# Patient Record
(demographics unavailable — no encounter records)

---

## 2024-10-21 NOTE — HISTORY OF PRESENT ILLNESS
[FreeTextEntry1] : Follow-up and disease management. [de-identified] : Patient is 77-year-old female who presents today for follow-up and disease management .  Medical history is significant for reflux, hyperlipidemia, hypertension and mild hyperglycemia.

## 2024-10-21 NOTE — REVIEW OF SYSTEMS
[Sore Throat] : sore throat [Heartburn] : heartburn [Joint Pain] : joint pain [Negative] : Heme/Lymph [Earache] : no earache [Hearing Loss] : no hearing loss [Nosebleed] : no nosebleeds [Hoarseness] : no hoarseness [Nasal Discharge] : no nasal discharge [Postnasal Drip] : no postnasal drip [Chest Pain] : no chest pain [Palpitations] : no palpitations [Leg Claudication] : no leg claudication [Lower Ext Edema] : no lower extremity edema [Orthopnea] : no orthopnea [Paroxysmal Nocturnal Dyspnea] : no paroxysmal nocturnal dyspnea [Abdominal Pain] : no abdominal pain [Nausea] : no nausea [Constipation] : no constipation [Diarrhea] : diarrhea [Vomiting] : no vomiting [Melena] : no melena [FreeTextEntry7] : Dyspepsia [FreeTextEntry9] : right elbow effusion,right wrist ganglion

## 2024-10-21 NOTE — HEALTH RISK ASSESSMENT
[Yes] : Yes [2 - 3 times a week (3 pts)] : 2 - 3  times a week (3 points) [1 or 2 (0 pts)] : 1 or 2 (0 points) [Never (0 pts)] : Never (0 points) [No] : In the past 12 months have you used drugs other than those required for medical reasons? No [No falls in past year] : Patient reported no falls in the past year [Never] : Never [Audit-CScore] : 3

## 2024-10-21 NOTE — ASSESSMENT
[FreeTextEntry1] : Assessment and plan:  1.  Fluzone high-dose influenza vaccine administered right deltoid  2.  Reflux continue H2 blocker famotidine 20 mg daily. 3.  Hyperlipidemia patient does follow a low-fat low-cholesterol diet and also continues to take simvastatin 20 mg p.o. at bedtime.  4.  Hypertension when first taken systolic blood pressure 156/65  repeat blood pressure at end of visit systolic blood pressure dropped to 138/60.  Patient will continue amlodipine, hydrochlorothiazide 25 and metoprolol succinate 25 mg.  5.  Comprehensive blood work drawn in office by examiner.  6.  Health maintenance issues discussed patient is candidate for RSV and COVID-19 vaccination she is up-to-date with Prevnar 20.  7.  Total time spent face-to-face and non-face-to-face time 35 minutes the majority of that time was spent on counseling and coordination of care.  Patient is mourning the death of her  she has no suicidal or homicidal ideation she is just melancholic and grieving.

## 2025-01-23 NOTE — HISTORY OF PRESENT ILLNESS
[FreeTextEntry1] : Patient with positive cardiac risk factors for CAD presents for f/u evaluation. on last vist pt c/o few episodes lasting minutes of right jaw pain radiating down chest and back on right side. last seen 1 year ago, pt denies new sx, was not compliant with stress test as her  was ill and subsequently passed. pt last seen 1 year ago.

## 2025-01-23 NOTE — DISCUSSION/SUMMARY
[Bundle Branch Block] : ~T bundle branch block [Stress Test Treadmill] : an exercise treadmill test [Isotope Perfusion Sestamibi] : cardiac perfusion imaging with sestamibi [Stable] : stable [GERD] : gastroesophageal reflux disease [Musculoskeletal Chest Pain] : musculoskeletal chest pain [Costochondritis] : costochondritis [Exercise Stress Test] : exercise stress test [Nuclear Imaging] : nuclear imaging [Hypertension] : hypertension [Not Responding to Treatment] : not responding to treatment [Echocardiogram] : echocardiogram [Medication Changes Per Orders] : Medication changes are as documented in orders [Minutes Spent: ___] : for [unfilled] ~Uminutes [de-identified] : inc norvasc to 5 mg, chg to am, consider add arb [FreeTextEntry2] : last seen > 1 year ago, reviewed prior eval

## 2025-02-25 NOTE — HEALTH RISK ASSESSMENT
[Very Good] : ~his/her~ current health as very good [Good] : ~his/her~  mood as  good [2 - 3 times a week (3 pts)] : 2 - 3  times a week (3 points) [1 or 2 (0 pts)] : 1 or 2 (0 points) [Never (0 pts)] : Never (0 points) [No] : In the past 12 months have you used drugs other than those required for medical reasons? No [No falls in past year] : Patient reported no falls in the past year [Little interest or pleasure doing things] : 1) Little interest or pleasure doing things [0] : 1) Little interest or pleasure doing things: Not at all (0) [Feeling down, depressed, or hopeless] : 2) Feeling down, depressed, or hopeless [Yes] : Reviewed medication list for presence of high-risk medications. [Never] : Never [NO] : No [Patient reported mammogram was normal] : Patient reported mammogram was normal [Patient reported bone density results were normal] : Patient reported bone density results were normal [HIV test declined] : HIV test declined [Hepatitis C test offered] : Hepatitis C test offered [None] : None [Alone] : lives alone [Retired] : retired [High School] : high school [] :  [Feels Safe at Home] : Feels safe at home [Fully functional (bathing, dressing, toileting, transferring, walking, feeding)] : Fully functional (bathing, dressing, toileting, transferring, walking, feeding) [Fully functional (using the telephone, shopping, preparing meals, housekeeping, doing laundry, using] : Fully functional and needs no help or supervision to perform IADLs (using the telephone, shopping, preparing meals, housekeeping, doing laundry, using transportation, managing medications and managing finances) [Smoke Detector] : smoke detector [Carbon Monoxide Detector] : carbon monoxide detector [Safety elements used in home] : safety elements used in home [1] : 2) Feeling down, depressed, or hopeless for several days (1) [PHQ-2 Negative - No further assessment needed] : PHQ-2 Negative - No further assessment needed [Time Spent: ___ Minutes] : I spent [unfilled] minutes performing a depression screening for this patient. [Patient declined PAP Smear] : Patient declined PAP Smear [Patient reported colonoscopy was normal] : Patient reported colonoscopy was normal [With Patient/Caregiver] : , with patient/caregiver [Reviewed no changes] : Reviewed, no changes [Designated Healthcare Proxy] : Designated healthcare proxy [Name: ___] : Health Care Proxy's Name: [unfilled]  [Relationship: ___] : Relationship: [unfilled] [Aggressive treatment] : aggressive treatment [I will adhere to the patient's wishes.] : I will adhere to the patient's wishes. [Audit-CScore] : 3 [[id number]] : 1 [FreeTextEntry1] : No high risk medications identified [Change in mental status noted] : No change in mental status noted [Language] : denies difficulty with language [Behavior] : denies difficulty with behavior [Learning/Retaining New Information] : denies difficulty learning/retaining new information [Handling Complex Tasks] : denies difficulty handling complex tasks [Reasoning] : denies difficulty with reasoning [Spatial Ability and Orientation] : denies difficulty with spatial ability and orientation [Sexually Active] : not sexually active [Reports changes in hearing] : Reports no changes in hearing [Reports changes in vision] : Reports no changes in vision [Reports normal functional visual acuity (ie: able to read med bottle)] : Reports poor functional visual acuity.  [Reports changes in dental health] : Reports no changes in dental health [MammogramDate] : 06/23 [BoneDensityDate] : 09/20 [ColonoscopyDate] : 04/11 [ColonoscopyComments] : Patient opts for fecal occult blood immunology. [AdvancecareDate] : 02/25

## 2025-02-25 NOTE — ASSESSMENT
[FreeTextEntry1] : Assessment and plan:  1.  Annual wellness exam no acute physical findings, patient scheduled for stress test by cardiology, depression screening done approximately 7 to 8 minutes was spent on it patient recently lost her  but denies any suicidal or homicidal ideation and does not require any medications.  2.  Reflux continue H2 blocker famotidine 20 mg daily. 3.  Hyperlipidemia patient does follow a low-fat low-cholesterol diet and also continues to take simvastatin 20 mg p.o. at bedtime.  4.  Hypertension when first taken systolic blood pressure 156/65  repeat blood pressure at end of visit systolic blood pressure dropped to 138/60.  Patient will continue amlodipine, hydrochlorothiazide 25 and metoprolol succinate 25 mg.  5.  Comprehensive blood work drawn in office by examiner.  6.  Health maintenance issues discussed patient is up-to-date with all of her vaccines including RSV and Zostavax.  Patient did have a colonoscopy many years ago declines colonoscopy at this time but is willing to do fecal occult blood immunology.  7.  No change in medical management.

## 2025-02-25 NOTE — HISTORY OF PRESENT ILLNESS
[FreeTextEntry1] : Annual wellness exam. [de-identified] : Patient is a 78-year-old female who presents today for annual wellness exam.  Medical history significant for atypical chest pain patient recently seen by cardiology given a clean bill of health, gastroesophageal reflux disease, hyperlipidemia, hypertension, peripheral vascular disease.  Presently the patient is awake alert and oriented x 3 she is in no acute distress calm and cooperative.

## 2025-04-10 NOTE — ASSESSMENT
[FreeTextEntry1] : Alert, oriented, well pleasant.   Sun-exposed cutaneous exam. No evidence of cutaneous malignancy. Well healed scars. No evidence of recurrence.  Brown, yellow papules and plaques generalized.  Including right inferior lateral leg. Seborrheic keratoses.  No treatment.  Actinic damage.  Reviewed sun protection.  Compliant.  eroded plaque 1x2cm medial left mid buttock. Itchy, tingling. No pain. Dermatitis. ?HSV, no previous history. Denies oozing. start triamcinolone ointment twice per day for 2-4 weeks.  f/u 1 month if not resolved.  Follow up 1 year.

## 2025-04-10 NOTE — HISTORY OF PRESENT ILLNESS
[FreeTextEntry1] : Skin check. Noted a new bump on left butt cheek last Thursday. "Started itching then little swelling" Treats with bacitracin with slight improvement.     [de-identified] : Personal history scc left hand. Father had skin cancer.    Wilfrido passed in September.

## 2025-07-08 NOTE — HEALTH RISK ASSESSMENT
[Yes] : Yes [2 - 3 times a week (3 pts)] : 2 - 3  times a week (3 points) [1 or 2 (0 pts)] : 1 or 2 (0 points) [Never (0 pts)] : Never (0 points) [No] : In the past 12 months have you used drugs other than those required for medical reasons? No [No falls in past year] : Patient reported no falls in the past year [Little interest or pleasure doing things] : 1) Little interest or pleasure doing things [Feeling down, depressed, or hopeless] : 2) Feeling down, depressed, or hopeless [0] : 2) Feeling down, depressed, or hopeless: Not at all (0) [PHQ-2 Negative - No further assessment needed] : PHQ-2 Negative - No further assessment needed [With Patient/Caregiver] : , with patient/caregiver [Reviewed no changes] : Reviewed, no changes [Designated Healthcare Proxy] : Designated healthcare proxy [Name: ___] : Health Care Proxy's Name: [unfilled]  [Relationship: ___] : Relationship: [unfilled] [Aggressive treatment] : aggressive treatment [I will adhere to the patient's wishes.] : I will adhere to the patient's wishes. [Never] : Never [de-identified] : Dermatology, cardiology. [Audit-CScore] : 3 [[id number]] : 0 [AdvancecareDate] : 07/25

## 2025-07-08 NOTE — END OF VISIT
[Time Spent: ___ minutes] : I have spent [unfilled] minutes of time on the encounter which excludes teaching and separately reported services.
Vaginal

## 2025-07-08 NOTE — HISTORY OF PRESENT ILLNESS
[FreeTextEntry1] : Patient presents today for follow-up and disease management. [de-identified] : Patient is a 78-year-old female who presents today for annual wellness exam.  Medical history significant for atypical chest pain patient recently seen by cardiology given a clean bill of health, gastroesophageal reflux disease, hyperlipidemia, hypertension, peripheral vascular disease.  Presently the patient is awake alert and oriented x 3 she is in no acute distress calm and cooperative.

## 2025-07-08 NOTE — ASSESSMENT
[FreeTextEntry1] : Assessment and plan: 1.  Gastroesophageal reflux/heartburn well-controlled with H2 blocker patient will continue famotidine 20 mg p.o. daily.  2.  Hyperlipidemia patient does follow a low-fat low-cholesterol diet and also continues to take simvastatin 20 mg p.o. at bedtime.  3.  Hypertension excellent blood pressure control with the combination amlodipine and metoprolol.  Patient discontinued hydrochlorothiazide but see that her blood pressure is well-controlled without that we will discontinue the medication  4.  Comprehensive blood work drawn in office by examiner at last visit reviewed at this.  5.  Health maintenance issues discussed patient prescription for mammogram, ultrasound of breast, and bone density given to patient she will have it done later this year.  6.  Nocturnal leg cramping recommendations maintain good hydration and magnesium 400 mg at bedtime.  7.  Total time spent face-to-face and non-face-to-face time 35 minutes the majority of which was spent on counseling and coordination of care. .